# Patient Record
Sex: MALE
[De-identification: names, ages, dates, MRNs, and addresses within clinical notes are randomized per-mention and may not be internally consistent; named-entity substitution may affect disease eponyms.]

---

## 2017-03-08 PROBLEM — Z00.00 ENCOUNTER FOR PREVENTIVE HEALTH EXAMINATION: Status: ACTIVE | Noted: 2017-03-08

## 2017-03-20 ENCOUNTER — APPOINTMENT (OUTPATIENT)
Dept: UROLOGY | Facility: CLINIC | Age: 22
End: 2017-03-20

## 2018-04-18 ENCOUNTER — APPOINTMENT (OUTPATIENT)
Dept: UROLOGY | Facility: CLINIC | Age: 23
End: 2018-04-18
Payer: MEDICAID

## 2018-04-18 VITALS
WEIGHT: 140 LBS | BODY MASS INDEX: 21.97 KG/M2 | HEART RATE: 90 BPM | SYSTOLIC BLOOD PRESSURE: 115 MMHG | DIASTOLIC BLOOD PRESSURE: 70 MMHG | HEIGHT: 67 IN

## 2018-04-18 DIAGNOSIS — N52.9 MALE ERECTILE DYSFUNCTION, UNSPECIFIED: ICD-10-CM

## 2018-04-18 DIAGNOSIS — Z78.9 OTHER SPECIFIED HEALTH STATUS: ICD-10-CM

## 2018-04-18 PROCEDURE — 99202 OFFICE O/P NEW SF 15 MIN: CPT

## 2018-04-18 RX ORDER — SILDENAFIL 20 MG/1
20 TABLET ORAL
Qty: 30 | Refills: 2 | Status: ACTIVE | COMMUNITY
Start: 2018-04-18 | End: 1900-01-01

## 2018-04-18 NOTE — PHYSICAL EXAM
[General Appearance - Well Developed] : well developed [General Appearance - Well Nourished] : well nourished [Normal Appearance] : normal appearance [Well Groomed] : well groomed [General Appearance - In No Acute Distress] : no acute distress [Edema] : no peripheral edema [Respiration, Rhythm And Depth] : normal respiratory rhythm and effort [Exaggerated Use Of Accessory Muscles For Inspiration] : no accessory muscle use [Abdomen Soft] : soft [Abdomen Tenderness] : non-tender [Costovertebral Angle Tenderness] : no ~M costovertebral angle tenderness [Normal Station and Gait] : the gait and station were normal for the patient's age [] : no rash [No Focal Deficits] : no focal deficits [Oriented To Time, Place, And Person] : oriented to person, place, and time [Affect] : the affect was normal [Mood] : the mood was normal [Not Anxious] : not anxious [Heart Rate And Rhythm] : Heart rate and rhythm were normal

## 2018-04-18 NOTE — ASSESSMENT
[FreeTextEntry1] : The pattern, wherein he can get a good erection depending on the environment but cannot get a good erection when he’s with his wife is strongly indicative of the fact that it’s not a physical problem. If it was physical and would not matter on any situation the body could not respond. If it was borderline on one and a little better than borderline on the other than the may be some element of supratentorial stress. However when you have it working fairly normally with audiovisual sexual stimulation when he is by himself but not working at all when he is with his wife and implies emotional.\par \par At the very least he has to stop the streaming. I don’t know if that in and of itself will be enough, he’s tried that before and it hasn’t. I’m also going to add in generic Viagra with him taking up to five of the 20 mg tablets at one time and I’m strongly suggesting that he let his Rabbi know what could be a con founding aspect i.e. the streaming and that he go to sex therapy. If sex therapy is tried it doesn’t work, sildenafil doesn’t help then I can teach him self injection. This would give him an erection that last long enough that he can have penis vaginal contact and we can then see if he can be brought to orgasm by his wife. Sometimes shock treating the brain i.e. forcing it to see that you will have sex allows it to reset itself and then he can go off of the injections. From what he says he’s definite that this is not a same-sex tendency he is attracted to women, he does find himself attracted to his wife it is more likely system overload from the streaming and someone who was really never had a normal sexual relationship.\par \par

## 2018-04-18 NOTE — LETTER HEADER
[Blanca Hull MD] : Blanca Hull MD [Director of Urology] : Director of Urology [[address]] : [address] [[address]] : [address] [[place] [address]] : [place] [address] [Tel [phone]] : Tel [phone] [Fax [phone]] : Fax [phone]

## 2018-04-18 NOTE — HISTORY OF PRESENT ILLNESS
[Erectile Dysfunction] : Erectile Dysfunction [None] : None [FreeTextEntry1] : ANTWAN COWART is a 22 year old male with no past medical history. Presents to the office today as a new patient for ED x 1 1/2 years. Patient only able to get a weak erection, unable to penetrate and sustain the erection. Patient states that he has tried Cilais 2 x approx 2 years ago with no success. Has sexual desire, no life stressors. Denies any trauma to genital area. Patient states that he has also been trying to have  a child with his spouse for the last year. \par \par with self stimulation it is much better with AVSS it gets close to normal but then rapidly ejaculates

## 2018-04-18 NOTE — LETTER BODY
[Dear  ___] : Dear  [unfilled], [Courtesy Letter:] : I had the pleasure of seeing your patient, [unfilled], in my office today. [Please see my note below.] : Please see my note below. [Sincerely,] : Sincerely, [FreeTextEntry2] : Dr. Grant Mcnamara\par [address] [place] [address]\[place] [address]

## 2018-04-18 NOTE — ADDENDUM
[FreeTextEntry1] : Exam of his genitalia revealed a normal penis is circumcised with normal sized testicles without tenderness. This is put in the addendum section for some reason the male genitalia exam did not pop up on this template

## 2018-05-16 ENCOUNTER — APPOINTMENT (OUTPATIENT)
Dept: UROLOGY | Facility: CLINIC | Age: 23
End: 2018-05-16